# Patient Record
Sex: MALE | Race: WHITE | NOT HISPANIC OR LATINO | URBAN - NONMETROPOLITAN AREA
[De-identification: names, ages, dates, MRNs, and addresses within clinical notes are randomized per-mention and may not be internally consistent; named-entity substitution may affect disease eponyms.]

---

## 2020-06-11 ENCOUNTER — IMPORTED ENCOUNTER (OUTPATIENT)
Dept: URBAN - NONMETROPOLITAN AREA CLINIC 1 | Facility: CLINIC | Age: 85
End: 2020-06-11

## 2020-06-11 PROBLEM — H35.373: Noted: 2017-01-04

## 2020-06-11 PROBLEM — H35.3231: Noted: 2020-06-11

## 2020-06-11 PROBLEM — H43.813: Noted: 2020-06-11

## 2020-06-11 PROCEDURE — 92015 DETERMINE REFRACTIVE STATE: CPT

## 2020-06-11 PROCEDURE — 92134 CPTRZ OPH DX IMG PST SGM RTA: CPT

## 2020-06-11 PROCEDURE — 92014 COMPRE OPH EXAM EST PT 1/>: CPT

## 2020-06-11 NOTE — PATIENT DISCUSSION
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 Ronel Patel patient agrees with plan-  Continue P/C IOL OU with PCO OU-  Discussed diagnosis in detail w/ pt today-  PCO OU noted but stable and no treatment needed at this time -  Continue to monitor PVD OU-  Discussed findings of exam in detail with the patient. -  The risk of retinal detachment in patients with PVDs was discussed with the patient and the warning signs of retinal detachment were carefully reviewed with the patient. -  The patient was warned to return to the office or contact the ophthalmologist on call immediately if they experience signs of retinal detachment or changes in vision noted from today.  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

## 2021-08-12 ENCOUNTER — IMPORTED ENCOUNTER (OUTPATIENT)
Dept: URBAN - NONMETROPOLITAN AREA CLINIC 1 | Facility: CLINIC | Age: 86
End: 2021-08-12

## 2021-08-12 PROBLEM — H26.493: Noted: 2021-08-12

## 2021-08-12 PROBLEM — H35.3231: Noted: 2020-06-11

## 2021-08-12 PROBLEM — H43.813: Noted: 2021-08-12

## 2021-08-12 PROBLEM — Z96.1: Noted: 2021-08-12

## 2021-08-12 PROBLEM — H52.4: Noted: 2021-08-12

## 2021-08-12 PROCEDURE — 99214 OFFICE O/P EST MOD 30 MIN: CPT

## 2021-08-12 PROCEDURE — 92134 CPTRZ OPH DX IMG PST SGM RTA: CPT

## 2021-08-12 PROCEDURE — 92015 DETERMINE REFRACTIVE STATE: CPT

## 2021-08-12 NOTE — PATIENT DISCUSSION
ARMD OU (with drusen and ERM) worsening OD>OSDiscussed diagnosis in detail with patient and patient's daughter. OCT done today shows subretinal fluid and thickening OU. Continue eye vitamins daily such as Preservision. Continue to use [employer] patient to call or come into the office ASAP if any changes noted from todayRecommend consult again with Dr. Emili Lechuga patient agrees with plan. P/C IOL OU with PCO OUDiscussed diagnosis in detail with patient. PCO OU noted but stable and no treatment needed at this time. Continue to monitor. PVD OUDiscussed findings of exam in detail with the patient. The risk of retinal detachment in patients with PVDs was discussed with the patient and the warning signs of retinal detachment were carefully reviewed with the patient. The patient was warned to return to the office or contact the ophthalmologist on call immediately if they experience signs of retinal detachment or changes in vision noted from today. Continue to monitor. Astigmatism / Hyperopia / Presbyopia OU Discussed refractive status in detail with patient. New glasses RX given today. Continue to monitor.

## 2022-04-09 ASSESSMENT — VISUAL ACUITY
OD_SC: 20/100
OS_SC: 20/50
OD_SC: 20/125
OS_SC: 20/100

## 2022-04-09 ASSESSMENT — TONOMETRY
OD_IOP_MMHG: 16
OD_IOP_MMHG: 15
OS_IOP_MMHG: 16
OS_IOP_MMHG: 15

## 2022-08-15 ENCOUNTER — ESTABLISHED PATIENT (OUTPATIENT)
Dept: URBAN - NONMETROPOLITAN AREA CLINIC 1 | Facility: CLINIC | Age: 87
End: 2022-08-15

## 2022-08-15 DIAGNOSIS — H35.3231: ICD-10-CM

## 2022-08-15 PROCEDURE — 99213 OFFICE O/P EST LOW 20 MIN: CPT

## 2022-08-15 ASSESSMENT — TONOMETRY
OD_IOP_MMHG: 10
OS_IOP_MMHG: 10

## 2022-08-15 ASSESSMENT — VISUAL ACUITY
OD_SC: 20/300
OS_SC: 20/300

## 2022-08-15 NOTE — PATIENT DISCUSSION
Recommend refer to Dr. Ingris Mandujano for further evaluation and treatment. Sample of [address] given today.